# Patient Record
Sex: MALE | Race: WHITE | NOT HISPANIC OR LATINO | URBAN - METROPOLITAN AREA
[De-identification: names, ages, dates, MRNs, and addresses within clinical notes are randomized per-mention and may not be internally consistent; named-entity substitution may affect disease eponyms.]

---

## 2019-12-12 ENCOUNTER — EMERGENCY (EMERGENCY)
Facility: HOSPITAL | Age: 44
LOS: 1 days | Discharge: HOME | End: 2019-12-12
Admitting: EMERGENCY MEDICINE
Payer: OTHER MISCELLANEOUS

## 2019-12-12 VITALS
HEART RATE: 79 BPM | OXYGEN SATURATION: 95 % | WEIGHT: 192.02 LBS | TEMPERATURE: 98 F | DIASTOLIC BLOOD PRESSURE: 73 MMHG | RESPIRATION RATE: 16 BRPM | SYSTOLIC BLOOD PRESSURE: 107 MMHG

## 2019-12-12 PROCEDURE — 99283 EMERGENCY DEPT VISIT LOW MDM: CPT | Mod: 25

## 2019-12-12 PROCEDURE — 73030 X-RAY EXAM OF SHOULDER: CPT | Mod: 26,LT

## 2019-12-12 PROCEDURE — 73080 X-RAY EXAM OF ELBOW: CPT | Mod: 26,50

## 2019-12-12 PROCEDURE — 73070 X-RAY EXAM OF ELBOW: CPT | Mod: 26,50

## 2019-12-12 RX ORDER — IBUPROFEN 200 MG
600 TABLET ORAL ONCE
Refills: 0 | Status: COMPLETED | OUTPATIENT
Start: 2019-12-12 | End: 2019-12-12

## 2019-12-12 RX ADMIN — Medication 600 MILLIGRAM(S): at 14:04

## 2019-12-12 NOTE — ED PROVIDER NOTE - NSFOLLOWUPINSTRUCTIONS_ED_ALL_ED_FT
Follow up with orthopedics.  Call for a follow up appointment.    RICE:  rest, ice, compress and elevate.  Ice for 20 minutes 4-5 times per day.    Wear sling for support as needed.  Remember to remove sling to range joints as discussed.
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
## 2019-12-12 NOTE — ED PROVIDER NOTE - CARE PROVIDER_API CALL
Forest Stewart)  Orthopaedic Surgery Surgery  [address] [place] [address]  Phone: [phone]  Fax: [phone]  Follow Up Time:

## 2019-12-12 NOTE — ED PROVIDER NOTE - DIAGNOSTIC INTERPRETATION
xray shoulder, left, 3 view: no acute fracture, no dislocation, normal AC separation  xray elbow, left, 3 view:  no acute fracture, no dislocation, no fat pad sign
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
## 2019-12-12 NOTE — ED PROVIDER NOTE - MUSCULOSKELETAL MINIMAL EXAM
L elbow:  lateral epicondyle TTP, decreased extension secondary to pain, full flexion, +2 radial pulse, 5/5 hand grasp.  L elbow: pain with full extension, no deformity./normal range of motion
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
## 2019-12-21 DIAGNOSIS — M25.522 PAIN IN LEFT ELBOW: ICD-10-CM

## 2019-12-21 DIAGNOSIS — S53.402A UNSPECIFIED SPRAIN OF LEFT ELBOW, INITIAL ENCOUNTER: ICD-10-CM
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
## 2021-12-06 NOTE — ED PROVIDER NOTE - NSTIMEPROVIDERCAREINITIATE_GEN_ER
Called and requested refill(s): Patient  Medications: alprazolam  Amount: 30 day supply  Pharmacy to be sent to: [facility] on [address]   Call back number: [phone]  Okay to leave detailed voice message: yes    
Please linette  
Refill called in  
12-Dec-2019 11:54

## 2022-02-25 NOTE — ED PROVIDER NOTE - PATIENT PORTAL LINK FT
After injection:  Have someone available to drive you home and stay with you for about 4 hours or until you have no weakness or numbness feeling in your limbs (which may happen from local anesthetic and may last for few hours)  Usually we use local anesthetic only without sedation. If you have IV sedation, do not drive or sign legal documents for 24 hours.    Your blood pressure may go up temporarily for several days following cortisone injection and should come back down on its own.     If you are diabetic, your blood sugar may go up temporarily for several days following cortisone injection and should come back down on its own. If your blood sugar goes above 300, you should contact your primary care physician or your endocrinologist who is managing your diabetes.    If you have severe congestive heart failure, the cortisone may worsen the condition temporarily.    If you do develop allergic reaction to any of the medications, it could be skin rash and/or itching and you may take Benadryl 25 mg tablet, which is over the counter. If that does not help or you develop any sensation of swelling in the tongue or throat or difficulty in breathing, you should seek immediate medical attention either through Urgent Care or Emergency Room, or call 911.     Discharge Medication Instructions Post Pain Procedure:  If you were on any of the following blood thinning medications:  · Not applicable.    I have not changed any of your self-reported or prescribed medications except as above. If you have questions regarding these medications, please contact the provider who prescribed each medication.    In case you have questions, call the [facility] program at [phone].    Kenrick Clark MD     You can access the FollowMyHealth Patient Portal offered by [facility] by registering at the following website: [email or web address]. By joining [facility]’s FollowMyHealth portal, you will also be able to view your health information using other applications (apps) compatible with our system.

## 2022-12-13 NOTE — ED ADULT NURSE NOTE - NSFALLRSKASSESSDT_ED_ALL_ED
